# Patient Record
Sex: MALE | Race: WHITE | URBAN - METROPOLITAN AREA
[De-identification: names, ages, dates, MRNs, and addresses within clinical notes are randomized per-mention and may not be internally consistent; named-entity substitution may affect disease eponyms.]

---

## 2017-07-20 ENCOUNTER — TELEPHONE (OUTPATIENT)
Dept: FAMILY MEDICINE | Facility: CLINIC | Age: 37
End: 2017-07-20

## 2017-07-20 DIAGNOSIS — B36.0 TV (TINEA VERSICOLOR): ICD-10-CM

## 2017-07-20 NOTE — TELEPHONE ENCOUNTER
Reason for Call:  Other prescription    Detailed comments: Patient is requesting Doxycycline 100MG.     Phone Number Patient can be reached at: Cell number on file:    Telephone Information:   Mobile [phone]       Best Time: Anytime     Can we leave a detailed message on this number? YES    Call taken on 7/20/2017 at 12:54 PM by Naman Jefferson

## 2017-07-20 NOTE — TELEPHONE ENCOUNTER
Pt is aware that has been more than a year seen by Dr. Phillip.  Has sun spots all over upper body.  No itching or pain or blisters.  Pt states that received this prescription last year.  Please review and advise when able.  Taty Otto RN

## 2017-07-22 RX ORDER — FLUCONAZOLE 200 MG/1
200 TABLET ORAL DAILY
Qty: 3 TABLET | Refills: 0 | Status: SHIPPED | OUTPATIENT
Start: 2017-07-22

## 2017-07-23 NOTE — TELEPHONE ENCOUNTER
I think he is meaning fluconazole for tinea versicolor (not doxy)   I will authorize one refill - patient should be seen for further refills.

## 2017-07-24 NOTE — TELEPHONE ENCOUNTER
Tin to patient and informed of below. Patient voiced understanding. No further questions or concerns.  Myriam Street RN.

## 2020-02-23 ENCOUNTER — HEALTH MAINTENANCE LETTER (OUTPATIENT)
Age: 40
End: 2020-02-23

## 2020-12-13 ENCOUNTER — HEALTH MAINTENANCE LETTER (OUTPATIENT)
Age: 40
End: 2020-12-13

## 2021-04-11 ENCOUNTER — HEALTH MAINTENANCE LETTER (OUTPATIENT)
Age: 41
End: 2021-04-11

## 2021-09-26 ENCOUNTER — HEALTH MAINTENANCE LETTER (OUTPATIENT)
Age: 41
End: 2021-09-26

## 2022-05-08 ENCOUNTER — HEALTH MAINTENANCE LETTER (OUTPATIENT)
Age: 42
End: 2022-05-08

## 2023-01-08 ENCOUNTER — HEALTH MAINTENANCE LETTER (OUTPATIENT)
Age: 43
End: 2023-01-08

## 2023-06-02 ENCOUNTER — HEALTH MAINTENANCE LETTER (OUTPATIENT)
Age: 43
End: 2023-06-02